# Patient Record
Sex: FEMALE | Race: WHITE | Employment: UNEMPLOYED | URBAN - METROPOLITAN AREA
[De-identification: names, ages, dates, MRNs, and addresses within clinical notes are randomized per-mention and may not be internally consistent; named-entity substitution may affect disease eponyms.]

---

## 2024-04-02 DIAGNOSIS — N62 HYPERTROPHY OF BREAST: Primary | ICD-10-CM

## 2024-04-26 DIAGNOSIS — N62 HYPERTROPHY OF BREAST: Primary | ICD-10-CM

## 2024-05-06 ENCOUNTER — APPOINTMENT (OUTPATIENT)
Dept: ADMINISTRATIVE | Facility: HOSPITAL | Age: 54
End: 2024-05-06
Payer: COMMERCIAL

## 2024-05-06 RX ORDER — ALPRAZOLAM 1 MG/1
1 TABLET ORAL
COMMUNITY

## 2024-05-06 RX ORDER — ZOLPIDEM TARTRATE 10 MG/1
12 TABLET ORAL
COMMUNITY

## 2024-05-06 RX ORDER — PANTOPRAZOLE SODIUM 40 MG/1
40 TABLET, DELAYED RELEASE ORAL
COMMUNITY

## 2024-05-06 RX ORDER — TIZANIDINE HYDROCHLORIDE 4 MG/1
4 CAPSULE, GELATIN COATED ORAL EVERY 8 HOURS
COMMUNITY

## 2024-05-06 RX ORDER — GARLIC EXTRACT 500 MG
1 CAPSULE ORAL DAILY
COMMUNITY

## 2024-05-06 RX ORDER — ROSUVASTATIN CALCIUM 10 MG/1
10 TABLET, COATED ORAL EVERY MORNING
COMMUNITY

## 2024-05-06 RX ORDER — ASPIRIN 81 MG/1
81 TABLET ORAL DAILY
COMMUNITY
End: 2024-05-06 | Stop reason: ALTCHOICE

## 2024-05-06 RX ORDER — FLUTICASONE PROPIONATE 50 MCG
2 SPRAY, SUSPENSION (ML) NASAL
COMMUNITY
Start: 2021-07-26

## 2024-05-06 RX ORDER — VENLAFAXINE HYDROCHLORIDE 37.5 MG/1
150 CAPSULE, EXTENDED RELEASE ORAL EVERY MORNING
COMMUNITY

## 2024-05-06 RX ORDER — MELATONIN 10 MG
1 CAPSULE ORAL DAILY
COMMUNITY

## 2024-05-09 ENCOUNTER — HOSPITAL ENCOUNTER (OUTPATIENT)
Dept: MAMMOGRAPHY | Facility: HOSPITAL | Age: 54
Discharge: HOME | End: 2024-05-09
Attending: PLASTIC SURGERY
Payer: COMMERCIAL

## 2024-05-09 DIAGNOSIS — N62 HYPERTROPHY OF BREAST: ICD-10-CM

## 2024-05-09 PROCEDURE — 77063 BREAST TOMOSYNTHESIS BI: CPT | Performed by: PLASTIC SURGERY

## 2024-05-09 PROCEDURE — 77067 SCR MAMMO BI INCL CAD: CPT | Performed by: PLASTIC SURGERY

## 2024-05-30 ENCOUNTER — ANESTHESIA EVENT (OUTPATIENT)
Dept: SURGERY | Facility: HOSPITAL | Age: 54
End: 2024-05-30
Payer: COMMERCIAL

## 2024-05-31 ENCOUNTER — HOSPITAL ENCOUNTER (OUTPATIENT)
Facility: HOSPITAL | Age: 54
Setting detail: HOSPITAL OUTPATIENT SURGERY
Discharge: HOME | End: 2024-05-31
Attending: PLASTIC SURGERY | Admitting: PLASTIC SURGERY
Payer: COMMERCIAL

## 2024-05-31 ENCOUNTER — ANESTHESIA (OUTPATIENT)
Dept: SURGERY | Facility: HOSPITAL | Age: 54
End: 2024-05-31
Payer: COMMERCIAL

## 2024-05-31 VITALS
HEART RATE: 80 BPM | OXYGEN SATURATION: 97 % | DIASTOLIC BLOOD PRESSURE: 85 MMHG | RESPIRATION RATE: 16 BRPM | BODY MASS INDEX: 24.83 KG/M2 | TEMPERATURE: 98 F | SYSTOLIC BLOOD PRESSURE: 142 MMHG | HEIGHT: 65 IN | WEIGHT: 149 LBS

## 2024-05-31 DIAGNOSIS — N62 HYPERTROPHY OF BREAST: ICD-10-CM

## 2024-05-31 PROCEDURE — 0HBV0ZZ EXCISION OF BILATERAL BREAST, OPEN APPROACH: ICD-10-PCS | Performed by: PLASTIC SURGERY

## 2024-05-31 PROCEDURE — 88305 TISSUE EXAM BY PATHOLOGIST: CPT | Performed by: PLASTIC SURGERY

## 2024-05-31 RX ORDER — MEPERIDINE HYDROCHLORIDE 25 MG/ML
12.5 INJECTION INTRAMUSCULAR; INTRAVENOUS; SUBCUTANEOUS AS NEEDED
Status: DISCONTINUED | OUTPATIENT
Start: 2024-05-31 | End: 2024-05-31

## 2024-05-31 RX ORDER — NALOXONE HYDROCHLORIDE 0.4 MG/ML
0.08 INJECTION, SOLUTION INTRAMUSCULAR; INTRAVENOUS; SUBCUTANEOUS AS NEEDED
Status: DISCONTINUED | OUTPATIENT
Start: 2024-05-31 | End: 2024-05-31

## 2024-05-31 RX ORDER — INDOCYANINE GREEN AND WATER 25 MG
KIT INJECTION AS NEEDED
Status: DISCONTINUED | OUTPATIENT
Start: 2024-05-31 | End: 2024-05-31 | Stop reason: SURG

## 2024-05-31 RX ORDER — HYDROMORPHONE HYDROCHLORIDE 1 MG/ML
INJECTION, SOLUTION INTRAMUSCULAR; INTRAVENOUS; SUBCUTANEOUS
Status: COMPLETED
Start: 2024-05-31 | End: 2024-05-31

## 2024-05-31 RX ORDER — HYDROMORPHONE HYDROCHLORIDE 1 MG/ML
0.4 INJECTION, SOLUTION INTRAMUSCULAR; INTRAVENOUS; SUBCUTANEOUS EVERY 5 MIN PRN
Status: DISCONTINUED | OUTPATIENT
Start: 2024-05-31 | End: 2024-05-31

## 2024-05-31 RX ORDER — BUPIVACAINE HYDROCHLORIDE 5 MG/ML
INJECTION, SOLUTION EPIDURAL; INTRACAUDAL AS NEEDED
Status: DISCONTINUED | OUTPATIENT
Start: 2024-05-31 | End: 2024-05-31 | Stop reason: HOSPADM

## 2024-05-31 RX ORDER — DEXAMETHASONE SODIUM PHOSPHATE 4 MG/ML
VIAL (ML) INJECTION AS NEEDED
Status: DISCONTINUED | OUTPATIENT
Start: 2024-05-31 | End: 2024-05-31 | Stop reason: SURG

## 2024-05-31 RX ORDER — ONDANSETRON 2 MG/ML
4 INJECTION INTRAMUSCULAR; INTRAVENOUS EVERY 6 HOURS PRN
Status: DISCONTINUED | OUTPATIENT
Start: 2024-05-31 | End: 2024-05-31

## 2024-05-31 RX ORDER — ACETAMINOPHEN 500 MG
1000 TABLET ORAL ONCE AS NEEDED
Status: COMPLETED | OUTPATIENT
Start: 2024-05-31 | End: 2024-05-31

## 2024-05-31 RX ORDER — HYDROCODONE BITARTRATE AND ACETAMINOPHEN 5; 325 MG/1; MG/1
2 TABLET ORAL ONCE AS NEEDED
Status: COMPLETED | OUTPATIENT
Start: 2024-05-31 | End: 2024-05-31

## 2024-05-31 RX ORDER — ACETAMINOPHEN 500 MG
1000 TABLET ORAL ONCE
Status: DISCONTINUED | OUTPATIENT
Start: 2024-05-31 | End: 2024-05-31 | Stop reason: HOSPADM

## 2024-05-31 RX ORDER — MIDAZOLAM HYDROCHLORIDE 1 MG/ML
1 INJECTION INTRAMUSCULAR; INTRAVENOUS EVERY 5 MIN PRN
Status: DISCONTINUED | OUTPATIENT
Start: 2024-05-31 | End: 2024-05-31

## 2024-05-31 RX ORDER — SCOLOPAMINE TRANSDERMAL SYSTEM 1 MG/1
1 PATCH, EXTENDED RELEASE TRANSDERMAL ONCE
Status: DISCONTINUED | OUTPATIENT
Start: 2024-05-31 | End: 2024-05-31 | Stop reason: HOSPADM

## 2024-05-31 RX ORDER — LIDOCAINE HYDROCHLORIDE 40 MG/ML
SOLUTION TOPICAL AS NEEDED
Status: DISCONTINUED | OUTPATIENT
Start: 2024-05-31 | End: 2024-05-31 | Stop reason: SURG

## 2024-05-31 RX ORDER — SODIUM CHLORIDE, SODIUM LACTATE, POTASSIUM CHLORIDE, CALCIUM CHLORIDE 600; 310; 30; 20 MG/100ML; MG/100ML; MG/100ML; MG/100ML
INJECTION, SOLUTION INTRAVENOUS CONTINUOUS
Status: DISCONTINUED | OUTPATIENT
Start: 2024-05-31 | End: 2024-05-31

## 2024-05-31 RX ORDER — NEOSTIGMINE METHYLSULFATE 1 MG/ML
INJECTION, SOLUTION INTRAVENOUS AS NEEDED
Status: DISCONTINUED | OUTPATIENT
Start: 2024-05-31 | End: 2024-05-31 | Stop reason: SURG

## 2024-05-31 RX ORDER — ROCURONIUM BROMIDE 10 MG/ML
INJECTION, SOLUTION INTRAVENOUS AS NEEDED
Status: DISCONTINUED | OUTPATIENT
Start: 2024-05-31 | End: 2024-05-31 | Stop reason: SURG

## 2024-05-31 RX ORDER — LABETALOL HYDROCHLORIDE 5 MG/ML
5 INJECTION, SOLUTION INTRAVENOUS EVERY 5 MIN PRN
Status: DISCONTINUED | OUTPATIENT
Start: 2024-05-31 | End: 2024-05-31

## 2024-05-31 RX ORDER — LIDOCAINE HYDROCHLORIDE 10 MG/ML
INJECTION, SOLUTION EPIDURAL; INFILTRATION; INTRACAUDAL; PERINEURAL AS NEEDED
Status: DISCONTINUED | OUTPATIENT
Start: 2024-05-31 | End: 2024-05-31 | Stop reason: SURG

## 2024-05-31 RX ORDER — HYDROCODONE BITARTRATE AND ACETAMINOPHEN 5; 325 MG/1; MG/1
1 TABLET ORAL ONCE AS NEEDED
Status: COMPLETED | OUTPATIENT
Start: 2024-05-31 | End: 2024-05-31

## 2024-05-31 RX ORDER — LIDOCAINE HYDROCHLORIDE AND EPINEPHRINE 10; 10 MG/ML; UG/ML
INJECTION, SOLUTION INFILTRATION; PERINEURAL AS NEEDED
Status: DISCONTINUED | OUTPATIENT
Start: 2024-05-31 | End: 2024-05-31 | Stop reason: HOSPADM

## 2024-05-31 RX ORDER — ONDANSETRON 2 MG/ML
INJECTION INTRAMUSCULAR; INTRAVENOUS AS NEEDED
Status: DISCONTINUED | OUTPATIENT
Start: 2024-05-31 | End: 2024-05-31 | Stop reason: SURG

## 2024-05-31 RX ORDER — GLYCOPYRROLATE 0.2 MG/ML
INJECTION, SOLUTION INTRAMUSCULAR; INTRAVENOUS AS NEEDED
Status: DISCONTINUED | OUTPATIENT
Start: 2024-05-31 | End: 2024-05-31 | Stop reason: SURG

## 2024-05-31 RX ORDER — HYDROMORPHONE HYDROCHLORIDE 1 MG/ML
0.2 INJECTION, SOLUTION INTRAMUSCULAR; INTRAVENOUS; SUBCUTANEOUS EVERY 5 MIN PRN
Status: DISCONTINUED | OUTPATIENT
Start: 2024-05-31 | End: 2024-05-31

## 2024-05-31 RX ORDER — PROCHLORPERAZINE EDISYLATE 5 MG/ML
5 INJECTION INTRAMUSCULAR; INTRAVENOUS EVERY 8 HOURS PRN
Status: DISCONTINUED | OUTPATIENT
Start: 2024-05-31 | End: 2024-05-31

## 2024-05-31 RX ORDER — HYDROMORPHONE HYDROCHLORIDE 1 MG/ML
0.6 INJECTION, SOLUTION INTRAMUSCULAR; INTRAVENOUS; SUBCUTANEOUS EVERY 5 MIN PRN
Status: DISCONTINUED | OUTPATIENT
Start: 2024-05-31 | End: 2024-05-31

## 2024-05-31 RX ADMIN — NEOSTIGMINE METHYLSULFATE 3 MG: 1 INJECTION, SOLUTION INTRAVENOUS at 10:37:00

## 2024-05-31 RX ADMIN — GLYCOPYRROLATE 0.4 MG: 0.2 INJECTION, SOLUTION INTRAMUSCULAR; INTRAVENOUS at 10:37:00

## 2024-05-31 RX ADMIN — INDOCYANINE GREEN AND WATER 7.5 MG: 25 MG KIT INJECTION at 09:45:00

## 2024-05-31 RX ADMIN — SODIUM CHLORIDE, SODIUM LACTATE, POTASSIUM CHLORIDE, CALCIUM CHLORIDE: 600; 310; 30; 20 INJECTION, SOLUTION INTRAVENOUS at 10:18:00

## 2024-05-31 RX ADMIN — ROCURONIUM BROMIDE 50 MG: 10 INJECTION, SOLUTION INTRAVENOUS at 07:43:00

## 2024-05-31 RX ADMIN — LIDOCAINE HYDROCHLORIDE 50 MG: 10 INJECTION, SOLUTION EPIDURAL; INFILTRATION; INTRACAUDAL; PERINEURAL at 07:43:00

## 2024-05-31 RX ADMIN — ROCURONIUM BROMIDE 20 MG: 10 INJECTION, SOLUTION INTRAVENOUS at 08:46:00

## 2024-05-31 RX ADMIN — LIDOCAINE HYDROCHLORIDE 4 ML: 40 SOLUTION TOPICAL at 07:45:00

## 2024-05-31 RX ADMIN — DEXAMETHASONE SODIUM PHOSPHATE 8 MG: 4 MG/ML VIAL (ML) INJECTION at 07:50:00

## 2024-05-31 RX ADMIN — ONDANSETRON 4 MG: 2 INJECTION INTRAMUSCULAR; INTRAVENOUS at 10:13:00

## 2024-05-31 RX ADMIN — SODIUM CHLORIDE, SODIUM LACTATE, POTASSIUM CHLORIDE, CALCIUM CHLORIDE: 600; 310; 30; 20 INJECTION, SOLUTION INTRAVENOUS at 07:40:00

## 2024-05-31 NOTE — ANESTHESIA POSTPROCEDURE EVALUATION
[facility]    Ghada Kelly Patient Status:  Hospital Outpatient Surgery   Age/Gender 54 year old female MRN [id number]   Location [facility] SURGERY Attending Keena Alvarado MD   Hosp Day # 0 PCP Yen Molina       Anesthesia Post-op Note    BILATERAL BREAST REDUCTION    Procedure Summary       Date: 05/31/24 Room / Location:  MAIN OR [address] [place] [address] MAIN OR    Anesthesia Start: 0740 Anesthesia Stop: 1055    Procedure: BILATERAL BREAST REDUCTION (Bilateral: Breast) Diagnosis:       Hypertrophy of breast      (Hypertrophy of breast [N62])    Surgeons: Keena Alvarado MD Anesthesiologist: Bam Borrero MD    Anesthesia Type: general ASA Status: 2            Anesthesia Type: general    Vitals Value Taken Time   /80 05/31/24 1055   Temp 98.2 05/31/24 1055   Pulse 100 05/31/24 1055   Resp 14 05/31/24 1055   SpO2 100 05/31/24 1055       Patient Location: PACU    Anesthesia Type: general    Airway Patency: patent and extubated    Postop Pain Control: adequate    Mental Status: preanesthetic baseline    Nausea/Vomiting: none    Cardiopulmonary/Hydration status: stable euvolemic    Complications: no apparent anesthesia related complications    Postop vital signs: stable    Dental Exam: Unchanged from Preop    Patient to be discharged from PACU when criteria met.

## 2024-05-31 NOTE — BRIEF OP NOTE
Pre-Operative Diagnosis: Hypertrophy of breast [N62]     Post-Operative Diagnosis: Hypertrophy of breast [N62]      Procedure Performed:   BILATERAL BREAST REDUCTION    Surgeons and Role:     * Keena Alvarado MD - Primary    Assistant(s):  Surgical Assistant: Gaetano Salazar     Surgical Findings: Normal breast tissue. Spy Elite: Satisfactory perfusion at 40 sec.     Specimen: Right and left breast tissue     Estimated Blood Loss: Blood Output: 75 mL (5/31/2024 10:27 AM)      Dictation Number:  ?    Keena Alvarado MD  5/31/2024  11:08 AM

## 2024-05-31 NOTE — ANESTHESIA PREPROCEDURE EVALUATION
PRE-OP EVALUATION    Patient Name: Ghada Kelly    Admit Diagnosis: Hypertrophy of breast [N62]    Pre-op Diagnosis: Hypertrophy of breast [N62]    BILATERAL BREAST REDUCTION    Anesthesia Procedure: BILATERAL BREAST REDUCTION (Bilateral)    Surgeons and Role:     * Keena Alvarado MD - Primary    Pre-op vitals reviewed.  Temp: 98.4 °F (36.9 °C)  Pulse: 76  Resp: 16  BP: 122/85  SpO2: 98 %  Body mass index is 24.79 kg/m².    Current medications reviewed.  Hospital Medications:   acetaminophen (Tylenol Extra Strength) tab 1,000 mg  1,000 mg Oral Once    scopolamine (Transderm-Scop) 1 MG/3DAYS patch 1 patch  1 patch Transdermal Once    lactated ringers infusion   Intravenous Continuous    ceFAZolin (Ancef) 2g in 10mL IV syringe premix  2 g Intravenous Once       Outpatient Medications:     Medications Prior to Admission   Medication Sig Dispense Refill Last Dose    ALPRAZolam 1 MG Oral Tab Take 1 tablet (1 mg total) by mouth.   5/30/2024    Cholecalciferol 1.25 MG (01989 UT) Oral Tab Take 1 tablet by mouth. Twice weekly   5/30/2024    fluticasone propionate 50 MCG/ACT Nasal Suspension 2 sprays by Nasal route.   5/30/2024    ipratropium-albuterol  MCG/ACT Inhalation Aero Soln Inhale 1 puff into the lungs as needed.   5/30/2024    pantoprazole 40 MG Oral Tab EC Take 1 tablet (40 mg total) by mouth before breakfast.   5/30/2024    rosuvastatin 10 MG Oral Tab Take 1 tablet (10 mg total) by mouth every morning.   5/30/2024    tiZANidine HCl 4 MG Oral Cap Take 1 capsule (4 mg total) by mouth every 8 (eight) hours.   5/30/2024    venlafaxine ER 37.5 MG Oral Capsule SR 24 Hr Take 4 capsules (150 mg total) by mouth every morning.   5/30/2024    zolpidem 10 MG Oral Tab Take 12 mg by mouth.   5/30/2024    acidophilus-pectin Oral Cap Take 1 capsule by mouth daily.   5/30/2024    Melatonin 10 MG Oral Cap Take 1 tablet by mouth daily.   5/30/2024    NON FORMULARY Vaginal health probiotic   5/30/2024       Allergies:  Risperidone and Neomycin-bacitracin-polymyxin      Anesthesia Evaluation    Patient summary reviewed.    Anesthetic Complications           GI/Hepatic/Renal      (+) GERD                           Cardiovascular                     (+) hyperlipidemia                                  Endo/Other                                  Pulmonary      (+) asthma                     Neuro/Psych      (+) depression                                History reviewed. No pertinent surgical history.  Social History     Socioeconomic History    Marital status:    Tobacco Use    Smoking status: Former     Types: Cigarettes     Start date: 2023     Quit date: 1981     Years since quittin.4    Smokeless tobacco: Never   Vaping Use    Vaping status: Former   Substance and Sexual Activity    Alcohol use: Not Currently    Drug use: Not Currently     Types: Cannabis     Comment: smoking - quit 2024     History   Drug Use Unknown     Comment: smoking - quit 2024     Available pre-op labs reviewed.               Airway      Mallampati: II  Mouth opening: >3 FB  TM distance: 4 - 6 cm  Neck ROM: full Cardiovascular    Cardiovascular exam normal.         Dental  Comment: Dentition appears grossly intact. Patient denies any loose, chipped or missing teeth other than as noted. Risks of dental trauma related to anesthesia including intubation and during emergence explained.      Dental appliance(s): lower dentures       Pulmonary    Pulmonary exam normal.                 Other findings              ASA: 2   Plan: general  NPO status verified and patient meets guidelines.    Post-procedure pain management plan discussed with surgeon and patient.    Comment: Options, risks and benefits of anesthesia as outlined in the anesthesia consent were reviewed with the patient. Risks and benefits of GA including sore throat, allergy, nausea, vomiting, dental trauma, pain management modalities were all discussed. Particularly the risk of  dental trauma with weakened teeth or crowns, partials, fillings and any non natural teeth due to instrumentation of oral cavity and airway. Patient understands risks and verbally agreed to proceed. All questions answered.The consent was signed without further questions.    Risk and benefits of nerve block explained including but not limited to: nerve damage, neuropathy, bleeding, infection.       Plan/risks discussed with: patient and spouse                Present on Admission:  **None**

## 2024-05-31 NOTE — DISCHARGE INSTRUCTIONS
Home Care Instructions Following Your Breast Reduction     Ghada-  We hope you were pleased with your care at [facility].  We wish you the best outcome and overall experience with your operation.  These instructions will help to minimize pain, optimize healing, and improve the likelihood of a successful result.    What To Expect  There will be some spotting of the incision lines for the next 1-2 weeks.  Your breasts will be swollen and might feel congested (similar to breast feeding) for the next 1-2 weeks  Temporary areas of numbness are typical in the early weeks following a breast reduction.  Normalization of sensation can typically take up to several months following the operation.    Bandages (Dressing)  Keep dressings clean and dry  Do not remove your bra  Reinforce the dressing with insertion of additional pads (pantiliners, incontinence pads) as needed  Do not shower until after your first appointment with Dr. Alvarado    Drain Care  Empty your JONAH drains at 8 am and 8 pm each day  Record each drain separately and use the drain sheet given to you by Dr. Alvarado to record the drainage. Follow the instructions on the drain sheet.  Wash your hands before and after emptying your drains  Bring drain sheet with you to your first office visit    Bathing/Showers  You can resume showers after your drains are removed in the office  No baths, swimming, or hot tubs until you receive medical permission    Pain Medication: Norco, Vicodin, or Tylenol #3  Take one or two tablets every four hours as needed for pain.  Do not exceed 8 (eight) tablets each day  Do not take narcotics, if you do not have pain.    Antibiotics:  Antibiotics will help minimize the risk of a wound infection  Fill the prescription as directed by Dr. Alvarado  Follow the instructions as written on the bottle's label  Call Dr. Alvarado, if you experience nausea, rash, or other symptoms which might be a possible side effect.    Over-The-Counter  Medication  Non-prescription anti-inflammatory medications can also help to ease the pain.  You can take Aleve or ibuprofen   Take as directed on the bottle  Drink a full glass of water with the medication    Home Medication  Resume your home medications as instructed  Do not resume herbal medications for two weeks    Diet  Resume your normal diet    Activity  No strenuous activity or heavy lifting  You can go up and down the stairs as tolerated.  Use common sense  You cannot return to work, if your work requires strenuous activity.  You cannot return to physical exercise, sports, or gym workouts until you are allowed to participate in strenuous activity.    Driving  Do not drive, if you are taking pain medication.    Return to Work or School  You can return to work when you are not taking pain medication, if your work does not involve strenuous activity.  Contact Dr. Alvarado's office, if you need a medical note.   You can return to school in 4-5 days but not sports or gym class for  6 weeks.    Follow-up Appointment with Dr. Christiano Alvarado scheduled your first postoperative visit at the time of your preoperative office visit.  Call Dr. Alvarado's office today for an appointment in two days, if you cannot remember the appointment details.  The number is [phone]  Verify your appointment date, day, time, and location.  At your 1st postoperative office visit:  Your drains will be removed, wounds will be evaluated, healing assessed, and any additional concerns and instructions will be discussed.    Questions or Concerns  Call Dr. Alvarado's cell,  if you experience severe pain not controlled by pain medication, swelling, numbness, tingling, bleeding, fever, or other concerns.    The number is: [phone] Urrutia  Thank you for coming to [facility] for your operation.  The nurses and the anesthesiologist try very hard to make sure you receive the best care possible.  Your trust in them is greatly appreciated.     Thanks so much,  Dr. Alvarado  TAKING CARE OF YOUR DRAIN(S)  Name:     / /   The purpose of your drain(s) is to evacuate the fluid, which accumulates within the surgical site. The drain is important to minimize fluid accumulation, minimize pain, and minimize infection. Each drain drains its own region.    Keep the drain pinned to your surgical garment. Protect the tubing from undue pulling and stress.  Empty your drains at 8 am and 8 pm each day. Instructions:  Wash your hands with soap and water  Unpin the drain from your garment  Hold the drain bulb with the plug upright.  Open the plug.  Empty the drain bulb fluid into the measuring cup.  The bulb might have more fluid than one measuring cupful. Empty the filled  cup into the toilet and refill the measuring cup with the bulb fluid until bulb is empty.  Turn empty bulb with opening upright, squeeze the bulb, and replug.  Re-pin drain bulb to your garment.  Record the amount of fluid you evacuated from the bulb.  Repeat this process, if you have more than one drain.  Record each drain in a separate column (see chart below).  Wash your hands with soap and water  Call Dr. Alvarado or her office, when the drain output is less than 30 ml. in a 24 period In other words, when the addition of two consecutive readings is less than 30 ml.    Date Time Right Drain (ml) Left Drain (ml) Addt'l Drain (ml)                                                                                                                                You have been given a prescription for Norco 5/325  Norco was Given to you at: 12:00 PM  Next dose due: 6:00 PM    Take this medication as directed  This medication contains Tylenol (acetaminophen)  Do not take additional Tylenol while taking Norco    Norco is a Narcotic and can be constipating or upset your stomach  Don't take Norco on an empty stomach  Drink plenty of water  Alcoholic beverages should be avoided while taking narcotics   (4) rarely moist

## 2024-05-31 NOTE — H&P
CHIEF COMPLAINT:   Macromastia with chronic pain and other symptoms    HISTORY:   Ghada Kelly is a 54 year old female who presents with chronic pain and other symptoms related to her breast hypertrophy.  Her symptoms are unresponsive to medical management.  Her recent mammogram is unremarkable.    PMH:     History reviewed. No pertinent surgical history.  No current facility-administered medications on file prior to encounter.     Current Outpatient Medications on File Prior to Encounter   Medication Sig Dispense Refill    ALPRAZolam 1 MG Oral Tab Take 1 tablet (1 mg total) by mouth.      Cholecalciferol 1.25 MG (12644 UT) Oral Tab Take 1 tablet by mouth. Twice weekly      fluticasone propionate 50 MCG/ACT Nasal Suspension 2 sprays by Nasal route.      ipratropium-albuterol  MCG/ACT Inhalation Aero Soln Inhale 1 puff into the lungs as needed.      pantoprazole 40 MG Oral Tab EC Take 1 tablet (40 mg total) by mouth before breakfast.      rosuvastatin 10 MG Oral Tab Take 1 tablet (10 mg total) by mouth every morning.      tiZANidine HCl 4 MG Oral Cap Take 1 capsule (4 mg total) by mouth every 8 (eight) hours.      venlafaxine ER 37.5 MG Oral Capsule SR 24 Hr Take 4 capsules (150 mg total) by mouth every morning.      zolpidem 10 MG Oral Tab Take 12 mg by mouth.      acidophilus-pectin Oral Cap Take 1 capsule by mouth daily.      Melatonin 10 MG Oral Cap Take 1 tablet by mouth daily.      NON FORMULARY Vaginal health probiotic       Allergies   Allergen Reactions    Risperidone ANAPHYLAXIS, OTHER (SEE COMMENTS) and SHORTNESS OF BREATH     Breathing problem    Neomycin-Bacitracin-Polymyxin RASH         ROS:   A comprehensive 10 point review of systems was completed.  Pertinent positives and negatives noted in the the HPI.    EXAM:     Vitals:    05/31/24 0624   BP: 122/85   Pulse: 76   Resp: 16   Temp: 98.4 °F (36.9 °C)     Breasts: Hypertrophy.  No breast masses or palpable lymph nodes  Heart:  Normal  Lungs: Clear    IMPRESSION:   Macromastia    PLAN:   Breast reduction.  The operation, goals, risks, alternatives, and postoperative care were discussed with Ghada Kelly.  Her questions were discussed and answered.  She understands and agrees to proceed.

## 2024-05-31 NOTE — ANESTHESIA PROCEDURE NOTES
Airway  Date/Time: 5/31/2024 7:45 AM  Urgency: elective    Airway not difficult    General Information and Staff    Patient location during procedure: OR  Anesthesiologist: Bam Borrero MD  Resident/CRNA: Nickolas Ott CRNA  Performed: CRNA   Performed by: Nickolas Ott CRNA  Authorized by: Bam Borrero MD      Indications and Patient Condition  Indications for airway management: anesthesia  Spontaneous Ventilation: absent  Sedation level: deep  Preoxygenated: yes  Patient position: sniffing  MILS maintained throughout  Mask difficulty assessment: 1 - vent by mask    Final Airway Details  Final airway type: endotracheal airway      Successful airway: ETT  Cuffed: yes   Successful intubation technique: direct laryngoscopy  Facilitating devices/methods: intubating stylet  Endotracheal tube insertion site: oral  Blade: Alex  Blade size: #3  ETT size (mm): 7.0    Cormack-Lehane Classification: grade I - full view of glottis  Placement verified by: capnometry   Cuff volume (mL): 6  Measured from: lips  Number of attempts at approach: 1  Number of other approaches attempted: 0    Additional Comments  Dentition per pre op

## 2024-06-05 NOTE — OPERATIVE REPORT
OPERATIVE NOTE: Bilateral Breast Reduction     NAME:     Ghada Kelly  DATE:      May 31, 2024   LOCATION:    [facility]  CSN:     [id number]  MRN:     [id number]  PREOPERATIVE DIAGNOSIS: Bilateral Macromastia  POSTOPERATIVE DIAGNOSIS: Same  SURGEON:    Dr. Keena Alvarado     ASSISTANT:    JORDYN Manzo  SURGICAL FINDINGS:  Normal breast tissue  SPECIMEN:    Right and Left Breast Tissue                                                                            INDICATION FOR TREATMENT:             Breast reduction is indicated to resolve Ghada's symptoms associated with her macromastia. She presents with bilateral macromastia and is a candidate for a bilateral breast reduction.  The procedure, its goals, alternative treatments, limitations, risks, and postoperative course were discussed with her. We agreed on the operative sites while I was marking the breasts.  Her additional questions have been discussed answered.  She understands the procedure and gives her consent.  15-20 minutes.     PROCEDURE:  She was preoperatively marked in the privacy of her SDS room.  Her midline anterior chest, clavicular margins, inframammary folds, vertical breast meridians, and planned lines for a vertical mastopexy were inscribed while she was in a sitting position.  She was brought to the operating room and placed on the OR table in a supine position. She was positioned after administration of general anesthesia.  Her breasts were prepped and draped in the usual fashion. A timeout was rendered by the circulating nurse and verified by Dr. Alvarado and the anesthesiologist.  An incision was made along the previously inscribed marks within the right areolus and the lines of the vertical mammoplasty inscription.  The skin inferior to the right areolus within the boundaries of the outer incision was de-epithelialized.  A right breast flap was then elevated deep to Sejal's fascia extending medial to the sternal border, lateral to  the anterior axillary line, inferior to the inframammary fold, and superiorly to the planned level of the nipple-areolar complex.  The vertical meridian to the inferior pedicle was marked with methylene blue.  The base width of the inferior pedicle was 9 cm, and the borders of the inferior pedicle was marked.  The breast tissue medial to the inferior pedicle, lateral to the sternal border, superior to the inframammary fold, deep to the medial flap, and superficial to the muscle was grossly removed.  The breast tissue lateral to the inferior pedicle, medial to the anterior axillary line, superior to the inframammary fold, deep to the lateral flap, superficial to the muscle was grossly removed. The superficial breast tissue was removed within the keyhole. The NAC was transposed into its recipient bed and loosely stapled.  The vertical limbs of the medial and lateral flap were loosely stapled.Patient was then placed in a sitting position.  Horizontal limbs to the medial and lateral flap were drawn with methylene blue.  The size and shape of the right breast was also assessed. She was then placed into a supine position, and the skin inferior to the horizontal limbs of the medial and lateral flap was excised.  Refinements were made to the breast tissue.  The right breast was loosely stapled closed, and the breast was again assessed in the sitting position.  This process was repeated until the surgeon was satisfied with the shape and size of the right breast.  The same steps were performed on the left breast.  Patient was placed in the sitting position to assess symmetry of breast shape, size, and position. Skin tailoring and breast tissue refinements were made with the patient in the supine position.  This process was again repeated until the surgeon was satisfied.  The SPY Elite System was used to determine vascular viability of the skin flaps.  Findings: Satisfactory perfusion at 40 seconds.  The right breast tissue  was inspected for hemostasis, irrigated with copious amounts of warm saline. The skin edges were injected with a 1:1 solution of Lidocaine 1% with 1:231981 Epinephrine and Marcaine 0.5%.  A 10 mm Federico-Chiang drain was placed deep to the flap closure and secured to the lateral inframammary fold with a 4-0 nylon.  The horizontal limbs to the medial and lateral flap were secured to the inframammary fold with a deep dermal running 4-0 Monocryl and a 4-0 Monocryl subcuticular suture. The vertical limbs to the medial and lateral flap were closed with a 4-0 Monocryl subcuticular suture.  The areolus closed with a  4-0 Monocryl subcuticular suture.  The same steps of obtaining hemostasis, irrigating, and closing were performed on the left breast.  The wounds were then steri-stripped, dressed with two Kerlix rolls, ABD pads, and a surgical bra.  Needle and sponge counts were correct.  There was no active bleeding.  All tissue visualized appeared to be grossly normal.  The flaps were viable in their entirety.  The patient was extubated in the operating room. Patient was taken to the PACU in stable condition.     Patient was given her prescriptions for pain medication and antibiotics, and her postoperative appointment was arranged prior to the operation.  She was given written instructions for home care.  She was discharged from the hospital to home in satisfactory condition.    The surgical assistant (BOUBACAR) was present to help set up the operating room, position the patient, and was scrubbed through the entirety of the procedure.  She helped to apply surgical dressings, transfer the patient to the [facility], and accompanied the transport of the patient to the PACU. The surgical assistant participated in all aspects of the procedure by critical retraction and exposure of the soft tissue for flap elevation. Her assistance was essential for the protection of vital anatomical structures and at all times medically necessary for the  surgeon to successfully complete the operation. A credentialed and well-trained surgical assistant's ability exceeds the ability of a surgical technician (CST).  Plastic surgical residents are not available at this institution, and the surgical assistant was required for the safety of patient care.       Right breast tissue removed: 713 gms.  Left breast tissue removed: 714 gms.

## (undated) DEVICE — #15 STERILE STAINLESS BLADE: Brand: STERILE STAINLESS BLADES

## (undated) DEVICE — APPLICATOR STD 6IN COT TIP WOOD HNDL ST

## (undated) DEVICE — DRAIN SUR W10MMXL20CM SIL FULL PERF HUBLESS

## (undated) DEVICE — VIAL LABORATORY SPY

## (undated) DEVICE — EVACUATOR SUR 100CC SIL BLB WND

## (undated) DEVICE — BANDAGE,GAUZE,BULKEE II,4.5"X4.1YD,STRL: Brand: MEDLINE

## (undated) DEVICE — PROXIMATE SKIN STAPLERS (35 WIDE) CONTAINS 35 STAINLESS STEEL STAPLES (FIXED HEAD): Brand: PROXIMATE

## (undated) DEVICE — SUT MCRYL 4-0 27IN ABSRB UD 19MM PS-2 3/8

## (undated) DEVICE — ADHESIVE LIQ 2/3ML VI MASTISOL

## (undated) DEVICE — PAD,ABDOMINAL,8"X7.5",ST,LF,20/BX: Brand: MEDLINE INDUSTRIES, INC.

## (undated) DEVICE — GOWN,SIRUS,FABRIC-REINFORCED,LARGE: Brand: MEDLINE

## (undated) DEVICE — SOLUTION IRRIG 1000ML 0.9% NACL USP BTL

## (undated) DEVICE — LAPAROTOMY SPONGE - RF AND X-RAY DETECTABLE PRE-WASHED: Brand: SITUATE

## (undated) DEVICE — BASIC DOUBLE BASIN 1-LF: Brand: MEDLINE INDUSTRIES, INC.

## (undated) DEVICE — SKN PREP SPNG STKS PVP PNT STR: Brand: MEDLINE INDUSTRIES, INC.

## (undated) DEVICE — CRADLE ARM W5XH3XL24IN PUR FOAM DISP

## (undated) DEVICE — 3M™ STERI-STRIP™ REINFORCED ADHESIVE SKIN CLOSURES, R1548, 1 IN X 5 IN (25 MM X 125 MM), 4 STRIPS/ENVELOPE: Brand: 3M™ STERI-STRIP™

## (undated) DEVICE — GLOVE SUR 6 SENSICARE PI PIP CRM PWD F

## (undated) DEVICE — DRAPE PACK CHEST

## (undated) DEVICE — 3M™ DURAPORE™ SURGICAL TAPE 2 INCHES X 10YARDS (5.0CM X 9.1M) 6ROLLS/CARTON 10CARTONS/CASE 1538-2: Brand: 3M™ DURAPORE™

## (undated) DEVICE — Device

## (undated) DEVICE — SKIN REG/FINE DUAL MARKER, RULER, LABELS: Brand: MEDLINE

## (undated) DEVICE — 3M™ STERI-STRIP™ REINFORCED ADHESIVE SKIN CLOSURES, R1547, 1/2 IN X 4 IN (12 MM X 100 MM), 6 STRIPS/ENVELOPE: Brand: 3M™ STERI-STRIP™

## (undated) DEVICE — 40580 - THE PINK PAD - ADVANCED TRENDELENBURG POSITIONING KIT: Brand: 40580 - THE PINK PAD - ADVANCED TRENDELENBURG POSITIONING KIT

## (undated) DEVICE — BREAST-HERNIA-PORT CDS-LF: Brand: MEDLINE INDUSTRIES, INC.

## (undated) DEVICE — 3M™ IOBAN™ 2 ANTIMICROBIAL INCISE DRAPE 6650EZ: Brand: IOBAN™ 2

## (undated) DEVICE — SUT ETHLN 4-0 18IN NABSRB BLK 19MM PS